# Patient Record
Sex: MALE
[De-identification: names, ages, dates, MRNs, and addresses within clinical notes are randomized per-mention and may not be internally consistent; named-entity substitution may affect disease eponyms.]

---

## 2019-02-18 ENCOUNTER — HOSPITAL ENCOUNTER (OUTPATIENT)
Dept: HOSPITAL 92 - SCSRAD | Age: 49
Discharge: HOME | End: 2019-02-18
Attending: FAMILY MEDICINE
Payer: COMMERCIAL

## 2019-02-18 DIAGNOSIS — J11.00: Primary | ICD-10-CM

## 2019-02-18 PROCEDURE — 71046 X-RAY EXAM CHEST 2 VIEWS: CPT

## 2019-02-18 NOTE — RAD
CHEST 2 VIEWS:

 

Date:  02/18/19 

 

HISTORY:  

J11. Shortness of breath. 

 

COMPARISON:  

None. 

 

FINDINGS:

Lungs are clear. No pneumothorax or effusion. Cardiac silhouette and mediastinal contours are within 
normal limits. 

 

IMPRESSION: 

No acute intrathoracic abnormality. 

 

 

POS: [facility]